# Patient Record
Sex: FEMALE | Race: OTHER | Employment: FULL TIME | URBAN - METROPOLITAN AREA
[De-identification: names, ages, dates, MRNs, and addresses within clinical notes are randomized per-mention and may not be internally consistent; named-entity substitution may affect disease eponyms.]

---
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
## 2022-05-19 ENCOUNTER — NURSE TRIAGE (OUTPATIENT)
Dept: OTHER | Facility: CLINIC | Age: 22
End: 2022-05-19

## 2022-05-19 NOTE — TELEPHONE ENCOUNTER
Received call from Jese Eugene at [facility] with Red Flag Complaint. Subjective: Caller states \"Yesterday I was overworked and was feeling fatigued\"     Current Symptoms: Back pain in lower back since yesterday. Radiates into back of her L leg. Fatigue. Nausea on and off. Chills. Stuffy nose. SOB on and off. No SOB at time of call. Headaches. Muscle aches. Dizziness when stands up. Blurry vision yesterday and when she woke up this AM, that has resolved. Onset: 1 day ago; worsening    Associated Symptoms: reduced activity    Pain Severity: 7-8/10; sharp and dull; constant, severe    Temperature: 101.6 by ear thermometer    What has been tried: Advil and Pain Relieving Lidocaine Patch-does help    LMP:  Pregnant:     Recommended disposition: See in Office Today    Care advice provided, patient verbalizes understanding; denies any other questions or concerns; instructed to call back for any new or worsening symptoms. Caller agrees to go to [facility]Walk In or may go to [facility] ER. Unsure at this time which location. Attention Provider: Thank you for allowing me to participate in the care of your patient. The patient was connected to triage in response to information provided to the [facility]. Please do not respond through this encounter as the response is not directed to a shared pool.     Reason for Disposition   Pain radiates into the thigh or further down the leg, and in both legs    Protocols used: BACK PAIN-ADULT-[place]

## 2022-12-06 ENCOUNTER — OFFICE VISIT (OUTPATIENT)
Dept: FAMILY MEDICINE CLINIC | Age: 22
End: 2022-12-06
Payer: COMMERCIAL

## 2022-12-06 VITALS
RESPIRATION RATE: 18 BRPM | TEMPERATURE: 98.6 F | HEIGHT: 66 IN | WEIGHT: 147.8 LBS | HEART RATE: 74 BPM | SYSTOLIC BLOOD PRESSURE: 123 MMHG | BODY MASS INDEX: 23.75 KG/M2 | OXYGEN SATURATION: 98 % | DIASTOLIC BLOOD PRESSURE: 72 MMHG

## 2022-12-06 DIAGNOSIS — N94.19 FUNCTIONAL DYSPAREUNIA: ICD-10-CM

## 2022-12-06 DIAGNOSIS — Z00.00 PREVENTATIVE HEALTH CARE: Primary | ICD-10-CM

## 2022-12-06 PROCEDURE — 99203 OFFICE O/P NEW LOW 30 MIN: CPT | Performed by: FAMILY MEDICINE

## 2022-12-06 NOTE — PROGRESS NOTES
Identified pt with two pt identifiers(name and ). Chief Complaint   Patient presents with    [facility]     Establishing care no major concerns     Labs     Patient is not fasting, she will have to return for fasting blood work     Vaginal Pain     Vaginal pain during intercourse, patient thinks it may be from IUD put in 8 months ago         Health Maintenance Due   Topic    Hepatitis C Screening     Depression Screen     COVID-19 Vaccine (1)    DTaP/Tdap/Td series (1 - Tdap)    HPV Age 9Y-34Y (1 - 2-dose series)    Flu Vaccine (1)       Wt Readings from Last 3 Encounters:   22 147 lb 12.8 oz (67 kg)   21 179 lb 10.8 oz (81.5 kg) (95 %, Z= 1.63)*     * Growth percentiles are based on CDC (Girls, 2-20 Years) data. Temp Readings from Last 3 Encounters:   22 98.6 °F (37 °C) (Temporal)   21 97.7 °F (36.5 °C)     BP Readings from Last 3 Encounters:   22 123/72   21 147/70     Pulse Readings from Last 3 Encounters:   22 74   21 77         Learning Assessment:  :     No flowsheet data found. Depression Screening:  :     3 most recent PHQ Screens 2022   Little interest or pleasure in doing things Not at all   Feeling down, depressed, irritable, or hopeless Not at all   Total Score PHQ 2 0       Fall Risk Assessment:  :     No flowsheet data found. Abuse Screening:  :     Abuse Screening Questionnaire 2022   Do you ever feel afraid of your partner? N   Are you in a relationship with someone who physically or mentally threatens you? N   Is it safe for you to go home?  Y       Coordination of Care Questionnaire:  :     1) Have you been to an emergency room, urgent care clinic since your last visit? no   Hospitalized since your last visit? no             2) Have you seen or consulted any other health care providers outside of [address] [place] [address] since your last visit? no  (Include any pap smears or colon screenings in this section.)    3) Do you have an Advance Directive on file? no  Are you interested in receiving information about Advance Directives? no    Patient is accompanied by self I have received verbal consent from Gee Calvin to discuss any/all medical information while they are present in the room. 4.  For patients aged 39-70: Has the patient had a colonoscopy / FIT/ Cologuard? NA - based on age      If the patient is female:    11. For patients aged 41-77: Has the patient had a mammogram within the past 2 years? NA - based on age or sex      10. For patients aged 21-65: Has the patient had a pap smear?  Yes - no Care Gap present

## 2022-12-12 NOTE — PROGRESS NOTES
Iline Phalen (: 2000) is a 23 y.o. female, new patient, here for evaluation of the following chief complaint(s):  Establish Care (Establishing care no major concerns ) and Labs (Patient is not fasting, she will have to return for fasting blood work )       ASSESSMENT/PLAN:  Below is the assessment and plan developed based on review of pertinent history, physical exam, labs, studies, and medications. 1. Preventative health care  -     CBC WITH AUTOMATED DIFF; Future  -     METABOLIC PANEL, COMPREHENSIVE; Future  -     LIPID PANEL; Future  2. Functional dyspareunia    Patient will follow up with the provider who placed to IUD to have it re-evaluated. No follow-ups on file. SUBJECTIVE/OBJECTIVE:  Iline Phalen is a 23 y.o. female presents to establish care, with complains of vaginal pain during intercourse, states that it could be due to an IUD that was placed about 8 months ago, denies bleeding, discharge, or bowel or bladder disturbance. Patient states that she will follow up with her gyn to re-assess the IUD. Additionally, from a primary care perspective, patient denies any complaints. Review of Systems   Constitutional:  Negative for activity change, appetite change, fatigue and fever. Gastrointestinal:  Negative for abdominal distention, abdominal pain, constipation, diarrhea, nausea and vomiting. Genitourinary:  Positive for dyspareunia and pelvic pain. Negative for dysuria, flank pain, hematuria and menstrual problem. Musculoskeletal:  Negative for back pain. Physical Exam  Constitutional:       Appearance: Normal appearance. She is normal weight. HENT:      Head: Normocephalic and atraumatic.       Right Ear: Tympanic membrane, ear canal and external ear normal.      Left Ear: Tympanic membrane and external ear normal.      Nose: Nose normal.      Mouth/Throat:      Mouth: Mucous membranes are moist.   Eyes:      Extraocular Movements: Extraocular movements intact. Conjunctiva/sclera: Conjunctivae normal.      Pupils: Pupils are equal, round, and reactive to light. Cardiovascular:      Rate and Rhythm: Normal rate and regular rhythm. Pulses: Normal pulses. Heart sounds: Normal heart sounds. Pulmonary:      Effort: Pulmonary effort is normal.      Breath sounds: Normal breath sounds. Abdominal:      General: Abdomen is flat. Bowel sounds are normal.      Palpations: Abdomen is soft. Musculoskeletal:         General: Normal range of motion. Cervical back: Normal range of motion and neck supple. Skin:     General: Skin is warm. Capillary Refill: Capillary refill takes less than 2 seconds. Neurological:      General: No focal deficit present. Mental Status: She is alert and oriented to person, place, and time. Mental status is at baseline. Psychiatric:         Mood and Affect: Mood normal.         Behavior: Behavior normal.         Thought Content: Thought content normal.         Judgment: Judgment normal.           An electronic signature was used to authenticate this note.   -- Brandin Ramírez MD